# Patient Record
(demographics unavailable — no encounter records)

---

## 2019-11-03 NOTE — ER DOCUMENT REPORT
ED General





- General


Chief Complaint: Psych Problem


Stated Complaint: IVC


Time Seen by Provider: 11/03/19 11:13


Information source: Patient


Notes: 





HPI: 37-year-old male is supposed to call her friend in [facility] stating 

that he was having suicidal thoughts.  He supposedly had a plan of jumping off a

bridge and if this was unsuccessful he would go to the beach and drown himself 

in the ocean.  Supposedly according to the friend the patient 5 years ago 

attempted to hang himself and was cut down by friends.  This friend in [facility] called the police to come and arrest the patient and bring him in for IVC. 

Patient denies all of this.  Patient does state that he is having increased 

depression given that his wife wants a divorce.  He denies any auditory or 

visual hallucinations.








ROS:  See HPI


All other review of systems reviewed and otherwise negative





Reviewed vital signs and nursing note as charted by RN.





PHYSICAL EXAM: 





CONSTITUTIONAL: Alert and oriented and responds appropriately to questions. 

Well-appearing; well-nourished





HEAD: Normocephalic; atraumatic





EYES: PERRL; full extraocular range of motion





ENT: Normal nose; no rhinorrhea; moist mucous membranes; pharynx without lesions

noted





NECK: Supple without meningismus; non-tender; no cervical lymphadenopathy, no 

masses





CARD: Regular rate and rhythm; no murmurs; symmetric distal pulses





RESP: Normal chest excursion without splinting or tachypnea; breath sounds clear

and equal bilaterally





ABD/GI: Normal bowel sounds; non-distended; soft, non-tender





BACK: The back appears normal and is non-tender to palpation





EXT: Normal ROM in all joints; non-tender to palpation; no edema





SKIN: No acute lesions noted





NEURO: CN 2-12 intact; 5/5 bilateral upper and lower extremity strength with 

sensation intact to light touch





PSYCH: The patient's mood and manner are appropriate. Grooming and personal 

hygiene are appropriate.





Past Medical History





- Social History


Smoking Status: Unknown if Ever Smoked


Chew tobacco use (# tins/day): No


Family History: Reviewed & Not Pertinent





Physical Exam





- Vital signs


Vitals: 


                                        











Temp Pulse BP Pulse Ox


 


 97.4 F   93   155/99 H  99 


 


 11/03/19 11:37  11/03/19 11:37  11/03/19 11:37  11/03/19 11:37














Course





- Re-evaluation


Re-evalutation: 





11/03/19 11:37


Given the above history and physical examination, we will obtain psychiatric 

laboratory profile values as well as an EKG and consult behavioral health.





11/03/19 13:19


EKG shows a heart of 67, normal sinus rhythm, normal axis, no ST elevation or 

depression.





11/03/19 13:19


Psychiatry is seen and assessed the patient.  They do believe with IVC at this 

time.  They will reassess the patient in the morning.





- Vital Signs


Vital signs: 


                                        











Temp Pulse Resp BP Pulse Ox


 


 97.4 F   93      155/99 H  99 


 


 11/03/19 11:37  11/03/19 11:37     11/03/19 11:37  11/03/19 11:37














- Laboratory


Result Diagrams: 


                                 11/03/19 11:45





                                 11/03/19 11:45


Laboratory results interpreted by me: 


                                        











  11/03/19 11/03/19





  11:45 11:45


 


WBC  12.2 H 


 


Chloride   110 H


 


Salicylates   < 1.0 L


 


Acetaminophen   < 10 L














Discharge





- Discharge


Clinical Impression: 


 Suicidal ideation





Condition: Fair


Disposition: PSYCH HOSP/UNIT

## 2019-11-03 NOTE — PSYCHOLOGICAL NOTE
Psych Note





- Psych Note


Date seen by psych provider: 11/03/19


Time seen by psych provider: 13:25


Psych Note: 


Reason for Consult: Suicidal ideation





pt brought in by ADELAIDA. [employer] officer Radha states they got a phone call from pt 

friend stating he said he was going to quit his job drive to the beach and jump 

off the pier. pt stated if the fall didn't kill him the hyperthermia would. pt 

placed in safe room in handcuffs pt belongings removed inventoried with security

and secured in locker. pt mood withdrawn and calm. during triage pt denied 

stating he was going to kill himself. pt stated "I told my friend I am going to 

quit my job and go to the beach so I can reflect on my wife leaving me. it a 

nice day for that." pt also stated that his wife is leaving him and taking his 4

kids to move back to [facility] because he works too much. 





Patient reported that his "brother" called the police because he was concerned 

"for my safety he knows what I am going through...."  He continued reports that 

his wife of 11 years  told him she no longer loves him and is leaving.  He 

states that she wants to move back to [place].  He denies any thoughts of 

wanting to harm himself and confirms he was thinking about quitting his job but 

denies this is an connection to any thoughts of harming himself; "people say 

things all the time that does not mean they are going to do it."  Patient 

reports approximately 7 years ago he was seen in a psychiatric facility for 3 

days and had to go to counseling afterwards because he "said" he wanted to harm 

himself.  He denies he did anything to harm himself and denies remembering 

exactly what he said that people thought he would harm himself.  When asked 

about an incident of hanging himself he states "that is what they said I was 

going to do but that is not true there was just a rope hanging in the garage 

from hunting."





Ángel Woodward [phone] 


Clinician contacted patient's friend who he was speaking with while driving 

around.  He reports that approximately 3-1/2 to 4 weeks ago the patient was told

by his wife that she was leaving him.  He reports this was unexpected to the 

patient.  He continued to report that he originally came up to stay with him and

was very depressed however this was to be expected for the situation.  He 

reports that this morning he got a phone call from the patient and the patient 

reported that he was working up the coverage to harm himself and planned to go 

into work and quit.  He states the patient was planning to jump off the [facility] [address] in [place], [place] (this is where patient's friend 

lives); however because it is a 12 Hour drive from the patient's current 

location, the patient started talking about jumping into the ocean off a pier.  

He reports that even if the fall did not kill him he would think that the 

hypothermia from the water would.  He continued report that the patient's 

father-in-law had committed suicide approximately a year ago and he remembered 

that his father-in-law had stated that once he work-up encourages easy after 

that.  He continued to disclose that proximally 6 to 7 years ago the patient's 

wife had to call 911 because he attempted to hang himself in the garage with an 

electrical cord.  He stated that he woke up to hearing the patient's wife 

screaming.  He reports the patient had already had a cord wrapped around his 

neck but was still attempting to throw it over the beams.  





Patient is alert and orientated to person, place, time and circumstance.  Mood 

and affect are blunted.  Patient denies suicidal and homicidal ideation.  

Delusions are absent and behaviors congruent with an intact reality based 

presentation ie organized and linear thought process.  Eye contact is poor.  

Conversational speech is irritable.  Intellectual abilities appear to be within 

the average range.  Attention and concentration are poor.  Insight, judgment, 

impulse control are poor.








Diagnosis:


Unspecified depressive disorder


Relationship discord








Medication recommendations per [facility]'s contracted psychiatrist Dr. Queta COLON 

are as follows


pending








Impression\plan: Patient is recommended for IVC.  There is significant concern 

that the patient presents with plans means and intent.  Patient reportedly had a

plan to jump off the [facility] [address] bridge in [facility] however because of the distance he was thinking about finding a closer pier

or bridge.  Patient reportedly felt that if the fall did not kill him than may 

be the water would be cold enough and he did get hyperthermia and die.  Patient 

was planning to go into work this morning he quit his job.  Trigger to current 

event is when his wife of 11 years told him approximately 3-1/2 to 4 weeks ago 

that she wanted to divorce.  Patient has attempted suicide approximately 6 to 7 

years ago where he tied an electrical cord around his neck and attempted to hang

himself in his garage. Dr. Villarreal was consulted to care management of this 

patient; attending physicians in agreement with recommendations and disposition.

## 2019-11-04 NOTE — ER DOCUMENT REPORT
Doctor's Note


Notes: 





11/04/2019


11:15


I have evaluated the patient at bedside.  He is calm and cooperative.  

Psychiatric staff states patient is being transferred to [facility] for continued 

care.


 is at bedside to transfer patient.





GENERAL: Alert, interacts well. No acute distress.


LUNGS: Clear to auscultation bilaterally, no wheezes, rales, or rhonchi. No 

respiratory distress.


HEART: Regular rate and rhythm. No murmur


SKIN: Warm, dry, normal turgor. No rashes or lesions noted.